# Patient Record
Sex: MALE | Race: OTHER | Employment: OTHER | URBAN - METROPOLITAN AREA
[De-identification: names, ages, dates, MRNs, and addresses within clinical notes are randomized per-mention and may not be internally consistent; named-entity substitution may affect disease eponyms.]

---

## 2017-01-26 ENCOUNTER — OFFICE VISIT (OUTPATIENT)
Dept: NEUROSURGERY | Age: 52
End: 2017-01-26

## 2017-01-26 VITALS
RESPIRATION RATE: 18 BRPM | SYSTOLIC BLOOD PRESSURE: 140 MMHG | HEART RATE: 104 BPM | TEMPERATURE: 97.6 F | DIASTOLIC BLOOD PRESSURE: 86 MMHG

## 2017-01-26 DIAGNOSIS — I60.9 SAH (SUBARACHNOID HEMORRHAGE) (HCC): Primary | ICD-10-CM

## 2017-01-26 DIAGNOSIS — M54.2 NECK PAIN: ICD-10-CM

## 2017-01-26 RX ORDER — ESOMEPRAZOLE MAGNESIUM 40 MG/1
1 CAPSULE, DELAYED RELEASE ORAL DAILY
COMMUNITY
Start: 2017-01-24

## 2017-01-30 ENCOUNTER — DOCUMENTATION ONLY (OUTPATIENT)
Dept: GENERAL RADIOLOGY | Age: 52
End: 2017-01-30

## 2017-01-30 NOTE — PROGRESS NOTES
This note is for clarification. Mr. Hodan Vela was admitted for subarachnoid hemorrhage in August of 2014. The patient had 2 diagnostic cerebral angiograms which were negative for a cause of his SAH. He never developed significant vasospasm. A follow up CTA of the head also failed to detect a cause. The thought is that his [facility] may have been due to a ruptured vein as is the case at times in perimesencephalic hemorrhage. However, the volume was larger than what is often seen in that condition. Alternatively, this could have been related to arterial hypertension among other causes such as occult aneurysm or vascular malformation. Therefore, we would like to get another CTA for followup in order to make sure that there is not an occult arterial abnormality. MD Cindy Cruz Neurointerventional Surgery

## 2017-02-03 ENCOUNTER — HOSPITAL ENCOUNTER (OUTPATIENT)
Dept: CT IMAGING | Age: 52
Discharge: HOME | End: 2017-02-03
Attending: NURSE PRACTITIONER
Payer: OTHER MISCELLANEOUS

## 2017-02-03 DIAGNOSIS — M54.2 NECK PAIN: ICD-10-CM

## 2017-02-03 DIAGNOSIS — I60.9 SAH (SUBARACHNOID HEMORRHAGE) (HCC): ICD-10-CM

## 2017-02-03 PROCEDURE — 74011636320 HC RX REV CODE- 636/320: Performed by: NURSE PRACTITIONER

## 2017-02-03 PROCEDURE — 74011000258 HC RX REV CODE- 258: Performed by: NURSE PRACTITIONER

## 2017-02-03 PROCEDURE — 70496 CT ANGIOGRAPHY HEAD: CPT

## 2017-02-03 PROCEDURE — 70498 CT ANGIOGRAPHY NECK: CPT

## 2017-02-03 RX ORDER — SODIUM CHLORIDE 0.9 % (FLUSH) 0.9 %
10 SYRINGE (ML) INJECTION
Status: COMPLETED | OUTPATIENT
Start: 2017-02-03 | End: 2017-02-03

## 2017-02-03 RX ADMIN — Medication 10 ML: at 11:04

## 2017-02-03 RX ADMIN — IOPAMIDOL 100 ML: 755 INJECTION, SOLUTION INTRAVENOUS at 11:04

## 2017-02-03 RX ADMIN — SODIUM CHLORIDE 100 ML: 900 INJECTION, SOLUTION INTRAVENOUS at 11:04

## 2017-02-07 ENCOUNTER — OFFICE VISIT (OUTPATIENT)
Dept: NEUROSURGERY | Age: 52
End: 2017-02-07

## 2017-02-07 VITALS
RESPIRATION RATE: 20 BRPM | HEART RATE: 76 BPM | TEMPERATURE: 98.2 F | DIASTOLIC BLOOD PRESSURE: 87 MMHG | SYSTOLIC BLOOD PRESSURE: 134 MMHG

## 2017-02-07 DIAGNOSIS — I60.9 SUBARACHNOID HEMORRHAGE (HCC): Primary | ICD-10-CM

## 2017-02-07 NOTE — MR AVS SNAPSHOT
Visit Information Date & Time Provider Department Dept. Phone Encounter #  
 2/7/2017 10:30 AM MD Merari Valenzuela Neurointerventional Surgery [phone] [id number] Follow-up Instructions Return if symptoms worsen or fail to improve. Upcoming Health Maintenance Date Due Hepatitis C Screening 1965 DTaP/Tdap/Td series (1 - Tdap) 1/6/1985 FOBT Q 1 YEAR AGE 50-75 1/6/2014 INFLUENZA AGE 9 TO ADULT 8/1/2016 Allergies as of 2/7/2017  Review Complete On: 2/7/2017 By: Scar Cam Severity Noted Reaction Type Reactions Fish Containing Products  08/22/2016    Hives, Swelling Other Medication  11/08/2010    Hives, Swelling  
 fish Current Immunizations  Reviewed on 8/28/2016 Name Date Influenza Vaccine Whole 10/30/2010 Not reviewed this visit Vitals BP Pulse Temp Resp Smoking Status 134/87 (BP 1 Location: Right arm, BP Patient Position: Sitting) 76 98.2 °F (36.8 °C) (Oral) 20 Never Smoker Your Updated Medication List  
  
   
This list is accurate as of: 2/7/17 10:32 AM.  Always use your most recent med list.  
  
  
  
  
 acetaminophen 325 mg tablet Commonly known as:  TYLENOL Take 2 Tabs by mouth every four (4) hours as needed. amLODIPine 10 mg tablet Commonly known as:  Jaz Croon Take 10 mg by mouth daily. esomeprazole 40 mg capsule Commonly known as:  Mertha Bandar Take 1 mg by mouth daily. lisinopril 20 mg tablet Commonly known as:  Manoj Economy Take  by mouth daily. TRIAMTERENE-HYDROCHLOROTHIAZIDE 37.5 MG-25 MG TAB Take 1 Tab by mouth daily. TRICOR 145 mg tablet Generic drug:  fenofibrate nanocrystallized Take  by mouth daily. Follow-up Instructions Return if symptoms worsen or fail to improve. Introducing [facility] & [facility] SERVICES!    
 Merari Hubbard introduces [employer] patient portal. Now you can access parts of your medical record, email your doctor's office, and request medication refills online. 1. In your internet browser, go to [email or web address] 2. Click on the First Time User? Click Here link in the Sign In box. You will see the New Member Sign Up page. 3. Enter your "[employer] Access Code exactly as it appears below. You will not need to use this code after youve completed the sign-up process. If you do not sign up before the expiration date, you must request a new code. · "[employer] Access Code: [email or web address] Expires: 4/26/2017 10:13 AM 
 
4. Enter the last four digits of your Social Security Number (xxxx) and Date of Birth (mm/dd/yyyy) as indicated and click Submit. You will be taken to the next sign-up page. 5. Create a "[employer] ID. This will be your "[employer] login ID and cannot be changed, so think of one that is secure and easy to remember. 6. Create a "[employer] password. You can change your password at any time. 7. Enter your Password Reset Question and Answer. This can be used at a later time if you forget your password. 8. Enter your e-mail address. You will receive e-mail notification when new information is available in [address] 9. Click Sign Up. You can now view and download portions of your medical record. 10. Click the Download Summary menu link to download a portable copy of your medical information. If you have questions, please visit the Frequently Asked Questions section of the "[employer] website. Remember, "[employer] is NOT to be used for urgent needs. For medical emergencies, dial 911. Now available from your iPhone and Android! Please provide this summary of care documentation to your next provider. Your primary care clinician is listed as Kimberly 42. If you have any questions after today's visit, please call [phone].

## 2017-02-07 NOTE — PROGRESS NOTES
Neurointerventional Surgery  Ambulatory Progress Note      Patient: Luis Simmons MRN: [id number]  SSN: [id number]    YOB: 1965  Age: 47 y.o. Sex: male      History of Present Illness:    47 y.o. Male admitted to [facility] 9/19/16 with perimesencephalic hemorrhage and discharged on 9/1/16 after multiple imaging studies showed no bleeding diathesis. Pt's course uneventful with the exception of mild vasospasm with no symptoms. Has returned to normal life with no complaints except occasional forgetfulness,  and occasional headaches/ neck pain for which he does not take any medications. He also notes a stiff neck when turning to the right which requires him to move his entire body to look over his shoulder. He denies any extremity numbness/weakness, B/B dysfunction, visual loss, dysarthria, etc.  Review of Systems    A comprehensive ROS was performed and was negative except for as per HPI. Objective:     Current Outpatient Prescriptions   Medication Sig Dispense Refill    esomeprazole (NEXIUM) 40 mg capsule Take 1 mg by mouth daily.  lisinopril (PRINIVIL, ZESTRIL) 20 mg tablet Take  by mouth daily.  fenofibrate nanocrystallized (TRICOR) 145 mg tablet Take  by mouth daily.  amlodipine (NORVASC) 10 mg tablet Take 10 mg by mouth daily.  TRIAMTERENE-HYDROCHLOROTHIAZIDE 37.5 MG-25 MG TAB Take 1 Tab by mouth daily.  acetaminophen (TYLENOL) 325 mg tablet Take 2 Tabs by mouth every four (4) hours as needed.  30 Tab 0        Visit Vitals    /87 (BP 1 Location: Right arm, BP Patient Position: Sitting)    Pulse 76    Temp 98.2 °F (36.8 °C) (Oral)    Resp 20       Allergies   Allergen Reactions    Fish Containing Products Hives and Swelling    Other Medication Hives and Swelling     fish        Physical Exam:  General: NAD  HEENT:  NC/AT, PERRL, EOMI, VF intact  Neck:  Limited range of motion to right  Extremities: extremities normal, atraumatic, no cyanosis or edema  Pulses: 2+ and symmetric    Neurologic Exam:  Mental Status:  Alert and oriented x 4. Appropriate affect, mood and behavior. Language:    Normal fluency, repetition, comprehension and naming. Cranial Nerves:   Pupils equal, round and reactive to light. Visual fields full to confrontation. Extraocular movements intact. Facial sensation intact V1 - V3. Full facial strength, no asymmetry. Hearing intact bilaterally. No dysarthria. Tongue protrudes to midline, palate elevates symmetrically. Shoulder shrug 5/5 bilaterally. Limited ROM on right head turning    Motor:    No pronator drift. Bulk and tone normal.      5/5 power in all extremities proximally and distally. No involuntary movements. Sensation:    Sensation intact throughout to light touch. Coordination & Gait: Normal.      Labs:  Lab Results   Component Value Date/Time    Sodium 139 08/27/2016 04:31 AM    Potassium 3.8 08/27/2016 04:31 AM    Chloride 109 08/27/2016 04:31 AM    CO2 21 08/27/2016 04:31 AM    Anion gap 9 08/27/2016 04:31 AM    Glucose 96 08/27/2016 04:31 AM    BUN 12 08/27/2016 04:31 AM    Creatinine 0.89 08/27/2016 04:31 AM    BUN/Creatinine ratio 13 08/27/2016 04:31 AM    GFR est AA >60 08/27/2016 04:31 AM    GFR est non-AA >60 08/27/2016 04:31 AM    Calcium 8.5 08/27/2016 04:31 AM     Lab Results   Component Value Date/Time    WBC 8.3 08/26/2016 04:37 AM    HGB 13.7 08/26/2016 04:37 AM    HCT 40.0 08/26/2016 04:37 AM    PLATELET 310 17/66/1651 04:37 AM    MCV 86.6 08/26/2016 04:37 AM     Lab Results   Component Value Date/Time    MCH 29.7 08/26/2016 04:37 AM    MCHC 34.3 08/26/2016 04:37 AM    BASOPHILS 0 08/26/2016 04:37 AM    ABS. LYMPHOCYTES 2.2 08/26/2016 04:37 AM    ABS. MONOCYTES 0.9 08/26/2016 04:37 AM    ABS. EOSINOPHILS 0.0 08/26/2016 04:37 AM    ABS.  BASOPHILS 0.0 08/26/2016 04:37 AM    Phosphorus 3.9 08/30/2016 04:42 AM    Magnesium 2.2 08/30/2016 04:42 AM     Imaging:  I personally reviewed today's CTA head and neck; no evidence of aneurysm, AVM, DAVF; extensive cervical facet disease, right greater than left, and left foraminal narrowing at multiple levels; no canal stenosis    Assessment/Plan:     I have personally seen and examined the patient. I have personally reviewed the chart and images. Elements of my examination included history of present illness, review of systems, review of past medical and surgical history, review of medications, and physical and neurological examination. I have personally reviewed the findings and impressions with my nurse practitioner and am in agreement with her note. We discussed the potential causes of his SAH, and the fact that we have yet to find a source that is treatable means this was most likely a venous bleed or a tiny aneurysm that self destructed. We also discussed his cervical spine disease, and went through what to look for (numbness, weakness, radiating pain). He doesn't have canal stenosis so his symptoms are more likely to be radicular than myeloplathinc.   At this time, I don't think we need to see him in follow up, but he knows to call or make an appointment if he has any new symptoms or recurrent symptoms. The patient expressed understanding of the disease process and management plan, and all questions were answered. Greater than 40 minutes were spent in patient management, greater than half of which was spent in counseling and coordination of care. Colonel Rusty M.D.     Medical Director  [facility] Neurointerventional Surgery Service  Lizeth Wallis    Professor, Departments of Radiology, Neurology and Neurological Surgery  [facility] of [address] [place] [address] Joelle Park MD

## 2017-06-23 DIAGNOSIS — I60.9 SAH (SUBARACHNOID HEMORRHAGE) (HCC): Primary | ICD-10-CM

## 2017-06-23 DIAGNOSIS — M21.379 FOOT-DROP, UNSPECIFIED LATERALITY: ICD-10-CM

## 2017-07-07 ENCOUNTER — HOSPITAL ENCOUNTER (OUTPATIENT)
Dept: MRI IMAGING | Age: 52
Discharge: HOME | End: 2017-07-07
Attending: NURSE PRACTITIONER
Payer: OTHER MISCELLANEOUS

## 2017-07-07 DIAGNOSIS — M21.379 FOOT-DROP, UNSPECIFIED LATERALITY: ICD-10-CM

## 2017-07-07 DIAGNOSIS — I60.9 SAH (SUBARACHNOID HEMORRHAGE) (HCC): ICD-10-CM

## 2017-07-07 LAB — CREAT BLD-MCNC: 0.9 MG/DL (ref 0.6–1.3)

## 2017-07-07 PROCEDURE — 74011250636 HC RX REV CODE- 250/636: Performed by: NURSE PRACTITIONER

## 2017-07-07 PROCEDURE — 82565 ASSAY OF CREATININE: CPT

## 2017-07-07 PROCEDURE — A9577 INJ MULTIHANCE: HCPCS | Performed by: NURSE PRACTITIONER

## 2017-07-07 PROCEDURE — 70544 MR ANGIOGRAPHY HEAD W/O DYE: CPT

## 2017-07-07 PROCEDURE — 70553 MRI BRAIN STEM W/O & W/DYE: CPT

## 2017-07-07 RX ADMIN — GADOBENATE DIMEGLUMINE 20 ML: 529 INJECTION, SOLUTION INTRAVENOUS at 15:02

## 2017-07-25 ENCOUNTER — OFFICE VISIT (OUTPATIENT)
Dept: NEUROSURGERY | Age: 52
End: 2017-07-25

## 2017-07-25 VITALS
WEIGHT: 255 LBS | HEIGHT: 74 IN | SYSTOLIC BLOOD PRESSURE: 131 MMHG | DIASTOLIC BLOOD PRESSURE: 89 MMHG | BODY MASS INDEX: 32.73 KG/M2 | RESPIRATION RATE: 20 BRPM | OXYGEN SATURATION: 95 % | TEMPERATURE: 97.7 F | HEART RATE: 98 BPM

## 2017-07-25 DIAGNOSIS — I60.9 SAH (SUBARACHNOID HEMORRHAGE) (HCC): Primary | ICD-10-CM

## 2017-07-25 NOTE — PROGRESS NOTES
Neurointerventional Surgery  Ambulatory Progress Note      Patient: Levi Owens MRN: [id number]  SSN: [id number]    YOB: 1965  Age: 47 y.o. Sex: male      History of Present Illness:      47 y.o. Male admitted to [facility] 9/19/16 with perimesencephalic hemorrhage and discharged on 9/1/16 after multiple imaging studies showed no bleeding diathesis. Pt's course uneventful with the exception of mild vasospasm with no symptoms. Has returned to normal life with no complaints except occasional forgetfulness,  and occasional headaches/ neck pain for which he does not take any medications. Most recently he has noticed that he occasionally trips over things, such as the carpet and the dog. He feels that he is unable to lift his left foot/leg up entirely, and this is what causes him to trip. He denies leg or back pain, numbness, right leg pain, orB/B dysfunction. He was recently seen by an orthopedist for evaluation for knee replacement. He states the orthopedist didn't think his knees were bad enough that he needed surgery yet, and instead he has been getting injections. He's not sure if the tripping episodes are related to a weak knee. However, he was concerned enough about it that he wanted to check back with us to see if it could have anything to do with his SAH. Review of Systems     A comprehensive ROS was performed and was negative except for as per HPI. Objective:     Current Outpatient Prescriptions   Medication Sig Dispense Refill    esomeprazole (NEXIUM) 40 mg capsule Take 1 mg by mouth daily.  lisinopril (PRINIVIL, ZESTRIL) 20 mg tablet Take  by mouth daily.  fenofibrate nanocrystallized (TRICOR) 145 mg tablet Take  by mouth daily.  amlodipine (NORVASC) 10 mg tablet Take 10 mg by mouth daily.  TRIAMTERENE-HYDROCHLOROTHIAZIDE 37.5 MG-25 MG TAB Take 1 Tab by mouth daily.  acetaminophen (TYLENOL) 325 mg tablet Take 2 Tabs by mouth every four (4) hours as needed. 30 Tab 0        Visit Vitals    /89 (BP 1 Location: Right arm, BP Patient Position: Sitting)    Pulse 98    Temp 97.7 °F (36.5 °C) (Oral)    Resp 20    Ht 6' 2\" (1.88 m)    Wt 115.7 kg (255 lb)    SpO2 95%    BMI 32.74 kg/m2       Allergies   Allergen Reactions    Fish Containing Products Hives and Swelling    Other Medication Hives and Swelling     fish        Physical Exam:  General: NAD  Lungs: nl excursion  Heart: RRR  Abdomen/trunk: no back tenderness  Extremities: extremities normal, atraumatic, no cyanosis or edema  Pulses: 2+ and symmetric    Neurologic Exam:  Mental Status:  Alert and oriented x 4. Appropriate affect, mood and behavior. Language:    Normal fluency, repetition, comprehension and naming. Cranial Nerves:   Pupils equal, round and reactive to light. Extraocular movements intact. Facial sensation intact V1 - V3. Full facial strength, no asymmetry. Hearing intact bilaterally. No dysarthria. Tongue protrudes to midline, palate elevates symmetrically. Shoulder shrug 5/5 bilaterally. Motor:    No pronator drift. Bulk and tone normal.      5/5 power in all extremities proximally and distally. Specifically, no weakness/numbness in the left lower extremity. No clonus. No involuntary movements. Sensation:    Sensation intact throughout to light touch. Coordination & Gait: Normal.  Can walk on heels and toes, backwards and forwards. No foot drop.       Labs:  Lab Results   Component Value Date/Time    Sodium 139 08/27/2016 04:31 AM    Potassium 3.8 08/27/2016 04:31 AM    Chloride 109 08/27/2016 04:31 AM    CO2 21 08/27/2016 04:31 AM    Anion gap 9 08/27/2016 04:31 AM    Glucose 96 08/27/2016 04:31 AM    BUN 12 08/27/2016 04:31 AM    Creatinine 0.89 08/27/2016 04:31 AM    BUN/Creatinine ratio 13 08/27/2016 04:31 AM    GFR est AA >60 08/27/2016 04:31 AM    GFR est non-AA >60 08/27/2016 04:31 AM    Calcium 8.5 08/27/2016 04:31 AM Lab Results   Component Value Date/Time    WBC 8.3 08/26/2016 04:37 AM    HGB 13.7 08/26/2016 04:37 AM    HCT 40.0 08/26/2016 04:37 AM    PLATELET 850 39/47/1853 04:37 AM    MCV 86.6 08/26/2016 04:37 AM     Lab Results   Component Value Date/Time    MCH 29.7 08/26/2016 04:37 AM    MCHC 34.3 08/26/2016 04:37 AM    BASOPHILS 0 08/26/2016 04:37 AM    ABS. LYMPHOCYTES 2.2 08/26/2016 04:37 AM    ABS. MONOCYTES 0.9 08/26/2016 04:37 AM    ABS. EOSINOPHILS 0.0 08/26/2016 04:37 AM    ABS. BASOPHILS 0.0 08/26/2016 04:37 AM    Phosphorus 3.9 08/30/2016 04:42 AM    Magnesium 2.2 08/30/2016 04:42 AM       I personally reviewed all of the patient's recent imaging with him. His MRI/MRA from 7/7/17 shows hemosiderin staining of the ventral renuka secondary to [facility]. MRA shows no evidence of aneurysm, AVM or DAVF  Assessment/Plan:     1. S/p SAH with full recovery; no bleeding diathesis discovered    2. New onset occasional left leg instability, ? Secondary to knee DJD    Plan:  Revisit further imaging of the spine if symptoms become worse or constant; refer to orthopedist for continued knee pain           I have personally seen and examined the patient. I have personally reviewed the chart and images. Elements of my examination included history of present illness, review of systems, review of past medical and surgical history, review of medications, and physical and neurological examination. I have personally reviewed the findings and impressions with my nurse practitioner and am in agreement with her note.     We discussed the potential causes of his tripping, including knee problems, nerve root irritation causing foot drop. He does not have a radiculopathy and on today's exam, he has full strength and stability. If the symptoms continue, may need to see his orthopedist again.  Lumbar spine imaging may be in order if the symptoms become progressive to rule out a DAVF.     The patient expressed understanding of the disease process and management plan, and all questions were answered.  Greater than 40 minutes were spent in patient management, greater than half of which was spent in counseling and coordination of care.  Germaine Abel M.D.  Jose Juan Martin     Professor, Departments of Radiology, Neurology and Neurological Surgery  [facility]

## 2017-07-25 NOTE — PATIENT INSTRUCTIONS
Neurological examination is normal  Most recent imaging (MRI/MRA) shows no evidence of vascular malformation, aneurysm, and other vascular disease. The brain is normal with the exception of some residual blood products from the initial hemorrhage.

## 2017-07-25 NOTE — MR AVS SNAPSHOT
Visit Information Date & Time Provider Department Dept. Phone Encounter #  
 7/25/2017 10:00 AM William Kendall MD [facility] Neurointerventional Surgery [phone] [id number] Follow-up Instructions Return if symptoms worsen or fail to improve. Upcoming Health Maintenance Date Due Hepatitis C Screening 1965 DTaP/Tdap/Td series (1 - Tdap) 1/6/1985 FOBT Q 1 YEAR AGE 50-75 1/6/2014 INFLUENZA AGE 9 TO ADULT 8/1/2017 Allergies as of 7/25/2017  Review Complete On: 7/25/2017 By: Nazia Rojas CNA Severity Noted Reaction Type Reactions Fish Containing Products  08/22/2016    Hives, Swelling Other Medication  11/08/2010    Hives, Swelling  
 fish Current Immunizations  Reviewed on 8/28/2016 Name Date Influenza Vaccine Whole 10/30/2010 Not reviewed this visit You Were Diagnosed With   
  
 Codes Comments SAH (subarachnoid hemorrhage) (HCC)    -  Primary ICD-10-CM: I60.9 ICD-9-CM: 346 Vitals BP Pulse Temp Resp Height(growth percentile) Weight(growth percentile) 131/89 (BP 1 Location: Right arm, BP Patient Position: Sitting) 98 97.7 °F (36.5 °C) (Oral) 20 6' 2\" (1.88 m) 255 lb (115.7 kg) SpO2 BMI Smoking Status 95% 32.74 kg/m2 Never Smoker BMI and BSA Data Body Mass Index Body Surface Area 32.74 kg/m 2 2.46 m 2 Your Updated Medication List  
  
   
This list is accurate as of: 7/25/17 10:31 AM.  Always use your most recent med list.  
  
  
  
  
 acetaminophen 325 mg tablet Commonly known as:  TYLENOL Take 2 Tabs by mouth every four (4) hours as needed. amLODIPine 10 mg tablet Commonly known as:  Bertha Bingham Take 10 mg by mouth daily. esomeprazole 40 mg capsule Commonly known as:  Soy Camacho Take 1 mg by mouth daily. lisinopril 20 mg tablet Commonly known as:  Altagracia Husbands Take  by mouth daily.   
  
 TRIAMTERENE-HYDROCHLOROTHIAZIDE 37.5 MG-25 MG TAB  
 Take 1 Tab by mouth daily. TRICOR 145 mg tablet Generic drug:  fenofibrate nanocrystallized Take  by mouth daily. Follow-up Instructions Return if symptoms worsen or fail to improve. Patient Instructions Neurological examination is normal 
Most recent imaging (MRI/MRA) shows no evidence of vascular malformation, aneurysm, and other vascular disease. The brain is normal with the exception of some residual blood products from the initial hemorrhage. Introducing [facility]! [facility] introduces [employer] patient portal. Now you can access parts of your medical record, email your doctor's office, and request medication refills online. 1. In your internet browser, go to https://[employer]. [employer] 2. Click on the First Time User? Click Here link in the Sign In box. You will see the New Member Sign Up page. 3. Enter your [employer] Access Code exactly as it appears below. You will not need to use this code after youve completed the sign-up process. If you do not sign up before the expiration date, you must request a new code. · [employer] Access Code: [email or web address] Expires: 9/24/2017 10:16 AM 
 
4. Enter the last four digits of your Social Security Number (xxxx) and Date of Birth (mm/dd/yyyy) as indicated and click Submit. You will be taken to the next sign-up page. 5. Create a [employer] ID. This will be your [employer] login ID and cannot be changed, so think of one that is secure and easy to remember. 6. Create a [employer] password. You can change your password at any time. 7. Enter your Password Reset Question and Answer. This can be used at a later time if you forget your password. 8. Enter your e-mail address. You will receive e-mail notification when new information is available in [address] 9. Click Sign Up. You can now view and download portions of your medical record.  
10. Click the Download Summary menu link to download a portable copy of your medical information. If you have questions, please visit the Frequently Asked Questions section of the [employer] website. Remember, [employer] is NOT to be used for urgent needs. For medical emergencies, dial 911. Now available from your iPhone and Android! Please provide this summary of care documentation to your next provider. Your primary care clinician is listed as Kimberly 42. If you have any questions after today's visit, please call [phone].

## 2023-04-21 ENCOUNTER — TRANSCRIBE ORDER (OUTPATIENT)
Dept: SCHEDULING | Age: 58
End: 2023-04-21

## 2023-04-21 DIAGNOSIS — R11.2 NAUSEA WITH VOMITING: ICD-10-CM

## 2023-04-21 DIAGNOSIS — R19.7 DIARRHEA: Primary | ICD-10-CM

## 2023-04-21 DIAGNOSIS — R10.9 ABDOMINAL PAIN: ICD-10-CM

## 2023-04-21 DIAGNOSIS — R63.4 WEIGHT LOSS: ICD-10-CM

## 2023-04-21 DIAGNOSIS — R10.84 GENERALIZED ABDOMINAL PAIN: ICD-10-CM
